# Patient Record
Sex: FEMALE | Employment: FULL TIME | URBAN - METROPOLITAN AREA
[De-identification: names, ages, dates, MRNs, and addresses within clinical notes are randomized per-mention and may not be internally consistent; named-entity substitution may affect disease eponyms.]

---

## 2017-09-27 ENCOUNTER — HOSPITAL ENCOUNTER (OUTPATIENT)
Dept: MAMMOGRAPHY | Age: 72
Discharge: HOME | End: 2017-09-27
Attending: FAMILY MEDICINE
Payer: MEDICARE

## 2017-09-27 DIAGNOSIS — Z12.31 VISIT FOR SCREENING MAMMOGRAM: ICD-10-CM

## 2017-09-27 PROCEDURE — 77067 SCR MAMMO BI INCL CAD: CPT

## 2019-12-12 ENCOUNTER — HOSPITAL ENCOUNTER (EMERGENCY)
Age: 74
Discharge: HOME | End: 2019-12-12
Attending: EMERGENCY MEDICINE
Payer: MEDICARE

## 2019-12-12 ENCOUNTER — APPOINTMENT (OUTPATIENT)
Dept: GENERAL RADIOLOGY | Age: 74
End: 2019-12-12
Attending: EMERGENCY MEDICINE
Payer: MEDICARE

## 2019-12-12 VITALS
HEART RATE: 65 BPM | OXYGEN SATURATION: 98 % | SYSTOLIC BLOOD PRESSURE: 106 MMHG | TEMPERATURE: 97.5 F | DIASTOLIC BLOOD PRESSURE: 67 MMHG | RESPIRATION RATE: 16 BRPM

## 2019-12-12 DIAGNOSIS — S60.211A CONTUSION OF RIGHT WRIST, INITIAL ENCOUNTER: ICD-10-CM

## 2019-12-12 DIAGNOSIS — S46.911A RIGHT SHOULDER STRAIN, INITIAL ENCOUNTER: ICD-10-CM

## 2019-12-12 DIAGNOSIS — S90.512A ABRASION OF LEFT ANKLE, INITIAL ENCOUNTER: Primary | ICD-10-CM

## 2019-12-12 DIAGNOSIS — H81.391: ICD-10-CM

## 2019-12-12 DIAGNOSIS — M76.812 ANTERIOR TIBIALIS TENDINITIS, LEFT: ICD-10-CM
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
## 2019-12-12 PROCEDURE — 74011250637 HC RX REV CODE- 250/637: Performed by: EMERGENCY MEDICINE

## 2019-12-12 PROCEDURE — 74011250636 HC RX REV CODE- 250/636: Performed by: EMERGENCY MEDICINE

## 2019-12-12 PROCEDURE — 73610 X-RAY EXAM OF ANKLE: CPT

## 2019-12-12 PROCEDURE — 99284 EMERGENCY DEPT VISIT MOD MDM: CPT

## 2019-12-12 RX ORDER — ACETAMINOPHEN 500 MG
1000 TABLET ORAL
Status: COMPLETED | OUTPATIENT
Start: 2019-12-12 | End: 2019-12-12

## 2019-12-12 RX ORDER — MECLIZINE HCL 12.5 MG 12.5 MG/1
50 TABLET ORAL
Status: COMPLETED | OUTPATIENT
Start: 2019-12-12 | End: 2019-12-12

## 2019-12-12 RX ORDER — ACETAMINOPHEN 500 MG
1000 TABLET ORAL
Qty: 20 TAB | Refills: 0 | Status: SHIPPED | OUTPATIENT
Start: 2019-12-12

## 2019-12-12 RX ORDER — IBUPROFEN 600 MG/1
600 TABLET ORAL
Qty: 20 TAB | Refills: 0 | Status: SHIPPED | OUTPATIENT
Start: 2019-12-12 | End: 2019-12-19

## 2019-12-12 RX ORDER — MECLIZINE HYDROCHLORIDE 25 MG/1
25 TABLET ORAL
Qty: 15 TAB | Refills: 0 | Status: SHIPPED | OUTPATIENT
Start: 2019-12-12 | End: 2019-12-22

## 2019-12-12 RX ADMIN — MECLIZINE 50 MG: 12.5 TABLET ORAL at 20:37

## 2019-12-12 RX ADMIN — ACETAMINOPHEN 1000 MG: 500 TABLET ORAL at 20:46

## 2019-12-13 NOTE — ED NOTES
I have reviewed discharge instructions with the patient. The patient verbalized understanding. Ambulatory to exit with steady gait, NAD.

## 2019-12-13 NOTE — ED PROVIDER NOTES
The history is provided by the patient. Fall   The accident occurred less than 1 hour ago. The fall occurred while walking (tripped over uneven sidewalk). She fell from a height of 1 - 2 ft. She landed on concrete. The volume of blood lost was minimal. Point of impact: left ankle, right hand. The pain is mild. She was ambulatory at the scene. There was no entrapment after the fall. There was no drug use involved in the accident. There was no alcohol use involved in the accident. Pertinent negatives include no vomiting and no laceration. Associated symptoms comments: A few episodes of vertigo after turning her head.         Past Medical History:   Diagnosis Date    Nausea & vomiting     Thyroid disease        Past Surgical History:   Procedure Laterality Date    HX BREAST BIOPSY Bilateral     80's and 90's; neg    HX CYST INCISION AND DRAINAGE Bilateral     over the years; neg    HX OTHER SURGICAL      colonoscopy - hx one colon polyp         Family History:   Problem Relation Age of Onset    Breast Cancer Mother 43    Cancer Father         brain    Breast Cancer Maternal Aunt        Social History     Socioeconomic History    Marital status:      Spouse name: Not on file    Number of children: Not on file    Years of education: Not on file    Highest education level: Not on file   Occupational History    Not on file   Social Needs    Financial resource strain: Not on file    Food insecurity:     Worry: Not on file     Inability: Not on file    Transportation needs:     Medical: Not on file     Non-medical: Not on file   Tobacco Use    Smoking status: Never Smoker    Smokeless tobacco: Never Used   Substance and Sexual Activity    Alcohol use: Yes     Comment: occasional glass of wine    Drug use: Not Currently    Sexual activity: Not on file   Lifestyle    Physical activity:     Days per week: Not on file     Minutes per session: Not on file    Stress: Not on file   Relationships    Social connections:     Talks on phone: Not on file     Gets together: Not on file     Attends Gnosticist service: Not on file     Active member of club or organization: Not on file     Attends meetings of clubs or organizations: Not on file     Relationship status: Not on file    Intimate partner violence:     Fear of current or ex partner: Not on file     Emotionally abused: Not on file     Physically abused: Not on file     Forced sexual activity: Not on file   Other Topics Concern    Not on file   Social History Narrative    Not on file         ALLERGIES: Patient has no known allergies. Review of Systems   Gastrointestinal: Negative for vomiting. All other systems reviewed and are negative. Vitals:    12/12/19 2025 12/12/19 2121   BP: 103/76 106/67   Pulse: 72 65   Resp: 16    Temp: 97.6 °F (36.4 °C) 97.5 °F (36.4 °C)   SpO2: 99% 98%            Physical Exam  Vitals signs and nursing note reviewed. Constitutional:       General: She is not in acute distress. Appearance: She is well-developed. HENT:      Head: Normocephalic and atraumatic. No contusion. Eyes:      Extraocular Movements: Extraocular movements intact. Right eye: No nystagmus. Left eye: No nystagmus. Conjunctiva/sclera: Conjunctivae normal.   Neck:      Musculoskeletal: Neck supple. Cardiovascular:      Rate and Rhythm: Normal rate and regular rhythm. Pulmonary:      Effort: Pulmonary effort is normal. No respiratory distress. Abdominal:      General: There is no distension. Musculoskeletal: Normal range of motion. General: No deformity. Comments: Tenderness over anterior ankle at tibialis tendon. Minimal right anterior shoulder tenderness. No wrist tenderness but endorsed pain with ulnar and radial deviation. No deformities. Abrasion of ankle. Skin:     General: Skin is warm and dry. Findings: No laceration. Neurological:      Mental Status: She is alert.       GCS: GCS eye subscore is 4. GCS verbal subscore is 5. GCS motor subscore is 6. Cranial Nerves: No cranial nerve deficit. Motor: Motor function is intact. Gait: Gait is intact. Comments: + right head impulse test   Psychiatric:         Behavior: Behavior normal.          MDM     68-year-old female presents after a trip and fall onto her outstretched right hand and scraping her left ankle. No signs of bony injury with evidence of soft tissue injuries to the left ankle. I suspect she has a mild strain of her right shoulder with contusion of wrist but no indication for imaging of her upper extremities. Patient given instructions for supportive care including NSAIDs, rest, ice, compression, and elevation to help alleviate symptoms. Plan to follow up with PCP as needed and return precautions discussed for worsening or new concerning symptoms.      Procedures

## 2019-12-13 NOTE — ED TRIAGE NOTES
Pt arrives s/p GLF. Dizziness s/p fall. Pt denies hitting head or LOC. Pt complaining of left ankle, right knee and right wrist pain. Advil approximately 30 mins.

## 2020-12-28 ENCOUNTER — TRANSCRIBE ORDER (OUTPATIENT)
Dept: SCHEDULING | Age: 75
End: 2020-12-28

## 2020-12-28 DIAGNOSIS — S83.249A TEAR OF MEDIAL MENISCUS OF KNEE, UNSPECIFIED LATERALITY, UNSPECIFIED TEAR TYPE, UNSPECIFIED WHETHER OLD OR CURRENT TEAR, INITIAL ENCOUNTER: Primary | ICD-10-CM

## 2020-12-28 DIAGNOSIS — M25.871 ANKLE IMPINGEMENT SYNDROME, RIGHT: ICD-10-CM

## 2021-01-05 ENCOUNTER — HOSPITAL ENCOUNTER (OUTPATIENT)
Dept: MRI IMAGING | Age: 76
Discharge: HOME | End: 2021-01-05
Attending: ORTHOPAEDIC SURGERY
Payer: MEDICARE

## 2021-01-05 DIAGNOSIS — S83.249A TEAR OF MEDIAL MENISCUS OF KNEE, UNSPECIFIED LATERALITY, UNSPECIFIED TEAR TYPE, UNSPECIFIED WHETHER OLD OR CURRENT TEAR, INITIAL ENCOUNTER: ICD-10-CM

## 2021-01-05 DIAGNOSIS — M25.871 ANKLE IMPINGEMENT SYNDROME, RIGHT: ICD-10-CM

## 2021-01-05 PROCEDURE — 73721 MRI JNT OF LWR EXTRE W/O DYE: CPT

## 2021-08-18 ENCOUNTER — TRANSCRIBE ORDER (OUTPATIENT)
Dept: SCHEDULING | Age: 76
End: 2021-08-18

## 2021-08-18 DIAGNOSIS — Z13.820 OSTEOPOROSIS SCREENING: Primary | ICD-10-CM

## 2021-08-18 DIAGNOSIS — N95.8 OTHER SPECIFIED MENOPAUSAL AND PERIMENOPAUSAL DISORDERS: ICD-10-CM

## 2021-08-18 DIAGNOSIS — Z12.31 ENCOUNTER FOR SCREENING MAMMOGRAM FOR MALIGNANT NEOPLASM OF BREAST: ICD-10-CM

## 2023-05-12 RX ORDER — ACETAMINOPHEN 500 MG
TABLET ORAL EVERY 6 HOURS PRN
COMMUNITY
Start: 2019-12-12

## 2023-05-12 RX ORDER — LEVOTHYROXINE SODIUM 137 UG/1
TABLET ORAL
COMMUNITY

## 2023-05-12 RX ORDER — BUPROPION HYDROCHLORIDE 300 MG/1
300 TABLET ORAL EVERY MORNING
COMMUNITY

## 2024-01-24 ENCOUNTER — HOSPITAL ENCOUNTER (OUTPATIENT)
Age: 79
Discharge: HOME | End: 2024-01-27
Payer: MEDICARE

## 2024-01-24 VITALS — BODY MASS INDEX: 23.66 KG/M2 | WEIGHT: 142 LBS | HEIGHT: 65 IN

## 2024-01-24 DIAGNOSIS — Z12.31 VISIT FOR SCREENING MAMMOGRAM: ICD-10-CM

## 2024-01-24 PROCEDURE — 77063 BREAST TOMOSYNTHESIS BI: CPT

## 2025-03-30 ENCOUNTER — APPOINTMENT (OUTPATIENT)
Facility: HOSPITAL | Age: 80
End: 2025-03-30
Payer: MEDICARE

## 2025-03-30 ENCOUNTER — HOSPITAL ENCOUNTER (OUTPATIENT)
Facility: HOSPITAL | Age: 80
Setting detail: OBSERVATION
Discharge: HOME | End: 2025-03-31
Attending: EMERGENCY MEDICINE | Admitting: FAMILY MEDICINE
Payer: MEDICARE

## 2025-03-30 DIAGNOSIS — R20.0 RIGHT FACIAL NUMBNESS: Primary | ICD-10-CM

## 2025-03-30 DIAGNOSIS — R42 DIZZINESS: ICD-10-CM

## 2025-03-30 DIAGNOSIS — H54.61 VISUAL LOSS, RIGHT EYE: ICD-10-CM

## 2025-03-30 LAB
ALBUMIN SERPL-MCNC: 3.3 G/DL (ref 3.5–5)
ALBUMIN/GLOB SERPL: 0.8 (ref 1.1–2.2)
ALP SERPL-CCNC: 81 U/L (ref 45–117)
ALT SERPL-CCNC: 13 U/L (ref 12–78)
ANION GAP SERPL CALC-SCNC: 6 MMOL/L (ref 2–12)
AST SERPL-CCNC: 30 U/L (ref 15–37)
BASOPHILS # BLD: 0.06 K/UL (ref 0–0.1)
BASOPHILS NFR BLD: 0.6 % (ref 0–1)
BILIRUB SERPL-MCNC: 0.9 MG/DL (ref 0.2–1)
BUN SERPL-MCNC: 13 MG/DL (ref 6–20)
BUN/CREAT SERPL: 16 (ref 12–20)
CALCIUM SERPL-MCNC: 9.2 MG/DL (ref 8.5–10.1)
CHLORIDE SERPL-SCNC: 104 MMOL/L (ref 97–108)
CO2 SERPL-SCNC: 22 MMOL/L (ref 21–32)
COMMENT:: NORMAL
CREAT SERPL-MCNC: 0.83 MG/DL (ref 0.55–1.02)
DIFFERENTIAL METHOD BLD: ABNORMAL
EOSINOPHIL # BLD: 0.07 K/UL (ref 0–0.4)
EOSINOPHIL NFR BLD: 0.7 % (ref 0–7)
ERYTHROCYTE [DISTWIDTH] IN BLOOD BY AUTOMATED COUNT: 11.9 % (ref 11.5–14.5)
GLOBULIN SER CALC-MCNC: 4.3 G/DL (ref 2–4)
GLUCOSE BLD STRIP.AUTO-MCNC: 97 MG/DL (ref 65–117)
GLUCOSE SERPL-MCNC: 85 MG/DL (ref 65–100)
HCT VFR BLD AUTO: 42.5 % (ref 35–47)
HGB BLD-MCNC: 14.5 G/DL (ref 11.5–16)
IMM GRANULOCYTES # BLD AUTO: 0.06 K/UL (ref 0–0.04)
IMM GRANULOCYTES NFR BLD AUTO: 0.6 % (ref 0–0.5)
INR PPP: 1 (ref 0.9–1.1)
LYMPHOCYTES # BLD: 1.91 K/UL (ref 0.8–3.5)
LYMPHOCYTES NFR BLD: 17.8 % (ref 12–49)
MAGNESIUM SERPL-MCNC: 2.5 MG/DL (ref 1.6–2.4)
MCH RBC QN AUTO: 31.5 PG (ref 26–34)
MCHC RBC AUTO-ENTMCNC: 34.1 G/DL (ref 30–36.5)
MCV RBC AUTO: 92.4 FL (ref 80–99)
MONOCYTES # BLD: 0.85 K/UL (ref 0–1)
MONOCYTES NFR BLD: 7.9 % (ref 5–13)
NEUTS SEG # BLD: 7.76 K/UL (ref 1.8–8)
NEUTS SEG NFR BLD: 72.4 % (ref 32–75)
NRBC # BLD: 0 K/UL (ref 0–0.01)
NRBC BLD-RTO: 0 PER 100 WBC
PLATELET # BLD AUTO: 234 K/UL (ref 150–400)
PMV BLD AUTO: 11.5 FL (ref 8.9–12.9)
POTASSIUM SERPL-SCNC: 4.9 MMOL/L (ref 3.5–5.1)
PROT SERPL-MCNC: 7.6 G/DL (ref 6.4–8.2)
PROTHROMBIN TIME: 10.8 SEC (ref 9.2–11.2)
RBC # BLD AUTO: 4.6 M/UL (ref 3.8–5.2)
SERVICE CMNT-IMP: NORMAL
SODIUM SERPL-SCNC: 132 MMOL/L (ref 136–145)
SPECIMEN HOLD: NORMAL
WBC # BLD AUTO: 10.7 K/UL (ref 3.6–11)

## 2025-03-30 PROCEDURE — 83735 ASSAY OF MAGNESIUM: CPT

## 2025-03-30 PROCEDURE — 70496 CT ANGIOGRAPHY HEAD: CPT

## 2025-03-30 PROCEDURE — 80053 COMPREHEN METABOLIC PANEL: CPT

## 2025-03-30 PROCEDURE — 99285 EMERGENCY DEPT VISIT HI MDM: CPT

## 2025-03-30 PROCEDURE — 82962 GLUCOSE BLOOD TEST: CPT

## 2025-03-30 PROCEDURE — 70450 CT HEAD/BRAIN W/O DYE: CPT

## 2025-03-30 PROCEDURE — 85610 PROTHROMBIN TIME: CPT

## 2025-03-30 PROCEDURE — 6360000004 HC RX CONTRAST MEDICATION: Performed by: RADIOLOGY

## 2025-03-30 PROCEDURE — 93005 ELECTROCARDIOGRAM TRACING: CPT | Performed by: EMERGENCY MEDICINE

## 2025-03-30 PROCEDURE — 85025 COMPLETE CBC W/AUTO DIFF WBC: CPT

## 2025-03-30 PROCEDURE — 83880 ASSAY OF NATRIURETIC PEPTIDE: CPT

## 2025-03-30 RX ORDER — IOPAMIDOL 755 MG/ML
100 INJECTION, SOLUTION INTRAVASCULAR
Status: COMPLETED | OUTPATIENT
Start: 2025-03-30 | End: 2025-03-30

## 2025-03-30 RX ADMIN — IOPAMIDOL 80 ML: 755 INJECTION, SOLUTION INTRAVENOUS at 22:24

## 2025-03-30 ASSESSMENT — PAIN DESCRIPTION - LOCATION: LOCATION: NECK

## 2025-03-30 ASSESSMENT — PAIN - FUNCTIONAL ASSESSMENT
PAIN_FUNCTIONAL_ASSESSMENT: PREVENTS OR INTERFERES SOME ACTIVE ACTIVITIES AND ADLS
PAIN_FUNCTIONAL_ASSESSMENT: 0-10

## 2025-03-30 ASSESSMENT — PAIN DESCRIPTION - ONSET: ONSET: ON-GOING

## 2025-03-30 ASSESSMENT — PAIN DESCRIPTION - DIRECTION: RADIATING_TOWARDS: TO HEAD

## 2025-03-30 ASSESSMENT — PAIN SCALES - GENERAL: PAINLEVEL_OUTOF10: 7

## 2025-03-30 ASSESSMENT — PAIN DESCRIPTION - DESCRIPTORS: DESCRIPTORS: ACHING

## 2025-03-30 ASSESSMENT — PAIN DESCRIPTION - FREQUENCY: FREQUENCY: CONTINUOUS

## 2025-03-30 ASSESSMENT — PAIN DESCRIPTION - ORIENTATION: ORIENTATION: LEFT

## 2025-03-31 ENCOUNTER — APPOINTMENT (OUTPATIENT)
Facility: HOSPITAL | Age: 80
End: 2025-03-31
Payer: MEDICARE

## 2025-03-31 VITALS
HEART RATE: 69 BPM | RESPIRATION RATE: 19 BRPM | SYSTOLIC BLOOD PRESSURE: 118 MMHG | DIASTOLIC BLOOD PRESSURE: 60 MMHG | OXYGEN SATURATION: 95 % | BODY MASS INDEX: 26.38 KG/M2 | TEMPERATURE: 98.6 F | HEIGHT: 64 IN | WEIGHT: 154.54 LBS

## 2025-03-31 PROBLEM — R29.90 STROKE-LIKE SYMPTOMS: Status: ACTIVE | Noted: 2025-03-31

## 2025-03-31 LAB
ALBUMIN SERPL-MCNC: 2.8 G/DL (ref 3.5–5)
ALBUMIN/GLOB SERPL: 0.8 (ref 1.1–2.2)
ALP SERPL-CCNC: 70 U/L (ref 45–117)
ALT SERPL-CCNC: 10 U/L (ref 12–78)
ANION GAP SERPL CALC-SCNC: 7 MMOL/L (ref 2–12)
AST SERPL-CCNC: 11 U/L (ref 15–37)
B PERT DNA SPEC QL NAA+PROBE: NOT DETECTED
BASOPHILS # BLD: 0.07 K/UL (ref 0–0.1)
BASOPHILS NFR BLD: 0.9 % (ref 0–1)
BILIRUB SERPL-MCNC: 0.6 MG/DL (ref 0.2–1)
BORDETELLA PARAPERTUSSIS BY PCR: NOT DETECTED
BUN SERPL-MCNC: 12 MG/DL (ref 6–20)
BUN/CREAT SERPL: 18 (ref 12–20)
C PNEUM DNA SPEC QL NAA+PROBE: NOT DETECTED
CALCIUM SERPL-MCNC: 8.6 MG/DL (ref 8.5–10.1)
CHLORIDE SERPL-SCNC: 105 MMOL/L (ref 97–108)
CHOLEST SERPL-MCNC: 138 MG/DL
CO2 SERPL-SCNC: 22 MMOL/L (ref 21–32)
CREAT SERPL-MCNC: 0.68 MG/DL (ref 0.55–1.02)
DIFFERENTIAL METHOD BLD: ABNORMAL
EKG ATRIAL RATE: 75 BPM
EKG DIAGNOSIS: NORMAL
EKG P AXIS: 82 DEGREES
EKG P-R INTERVAL: 144 MS
EKG Q-T INTERVAL: 384 MS
EKG QRS DURATION: 72 MS
EKG QTC CALCULATION (BAZETT): 428 MS
EKG R AXIS: 79 DEGREES
EKG T AXIS: 59 DEGREES
EKG VENTRICULAR RATE: 75 BPM
EOSINOPHIL # BLD: 0.14 K/UL (ref 0–0.4)
EOSINOPHIL NFR BLD: 1.7 % (ref 0–7)
ERYTHROCYTE [DISTWIDTH] IN BLOOD BY AUTOMATED COUNT: 12 % (ref 11.5–14.5)
EST. AVERAGE GLUCOSE BLD GHB EST-MCNC: 100 MG/DL
FLUAV SUBTYP SPEC NAA+PROBE: NOT DETECTED
FLUBV RNA SPEC QL NAA+PROBE: NOT DETECTED
GLOBULIN SER CALC-MCNC: 3.7 G/DL (ref 2–4)
GLUCOSE SERPL-MCNC: 78 MG/DL (ref 65–100)
HADV DNA SPEC QL NAA+PROBE: NOT DETECTED
HBA1C MFR BLD: 5.1 % (ref 4–5.6)
HCOV 229E RNA SPEC QL NAA+PROBE: NOT DETECTED
HCOV HKU1 RNA SPEC QL NAA+PROBE: NOT DETECTED
HCOV NL63 RNA SPEC QL NAA+PROBE: NOT DETECTED
HCOV OC43 RNA SPEC QL NAA+PROBE: NOT DETECTED
HCT VFR BLD AUTO: 42.3 % (ref 35–47)
HDLC SERPL-MCNC: 52 MG/DL
HDLC SERPL: 2.7 (ref 0–5)
HGB BLD-MCNC: 14.4 G/DL (ref 11.5–16)
HMPV RNA SPEC QL NAA+PROBE: NOT DETECTED
HPIV1 RNA SPEC QL NAA+PROBE: NOT DETECTED
HPIV2 RNA SPEC QL NAA+PROBE: NOT DETECTED
HPIV3 RNA SPEC QL NAA+PROBE: NOT DETECTED
HPIV4 RNA SPEC QL NAA+PROBE: NOT DETECTED
IMM GRANULOCYTES # BLD AUTO: 0.05 K/UL (ref 0–0.04)
IMM GRANULOCYTES NFR BLD AUTO: 0.6 % (ref 0–0.5)
LDLC SERPL CALC-MCNC: 71.4 MG/DL (ref 0–100)
LYMPHOCYTES # BLD: 1.71 K/UL (ref 0.8–3.5)
LYMPHOCYTES NFR BLD: 21.3 % (ref 12–49)
M PNEUMO DNA SPEC QL NAA+PROBE: NOT DETECTED
MAGNESIUM SERPL-MCNC: 2.3 MG/DL (ref 1.6–2.4)
MCH RBC QN AUTO: 31.2 PG (ref 26–34)
MCHC RBC AUTO-ENTMCNC: 34 G/DL (ref 30–36.5)
MCV RBC AUTO: 91.6 FL (ref 80–99)
MONOCYTES # BLD: 0.66 K/UL (ref 0–1)
MONOCYTES NFR BLD: 8.2 % (ref 5–13)
NEUTS SEG # BLD: 5.39 K/UL (ref 1.8–8)
NEUTS SEG NFR BLD: 67.3 % (ref 32–75)
NRBC # BLD: 0 K/UL (ref 0–0.01)
NRBC BLD-RTO: 0 PER 100 WBC
NT PRO BNP: 53 PG/ML
PLATELET # BLD AUTO: 221 K/UL (ref 150–400)
PMV BLD AUTO: 11.7 FL (ref 8.9–12.9)
POTASSIUM SERPL-SCNC: 3.2 MMOL/L (ref 3.5–5.1)
PROT SERPL-MCNC: 6.5 G/DL (ref 6.4–8.2)
RBC # BLD AUTO: 4.62 M/UL (ref 3.8–5.2)
RSV RNA SPEC QL NAA+PROBE: NOT DETECTED
RV+EV RNA SPEC QL NAA+PROBE: NOT DETECTED
SARS-COV-2 RNA RESP QL NAA+PROBE: NOT DETECTED
SODIUM SERPL-SCNC: 134 MMOL/L (ref 136–145)
TRIGL SERPL-MCNC: 73 MG/DL
VLDLC SERPL CALC-MCNC: 14.6 MG/DL
WBC # BLD AUTO: 8 K/UL (ref 3.6–11)

## 2025-03-31 PROCEDURE — 83036 HEMOGLOBIN GLYCOSYLATED A1C: CPT

## 2025-03-31 PROCEDURE — 6370000000 HC RX 637 (ALT 250 FOR IP): Performed by: FAMILY MEDICINE

## 2025-03-31 PROCEDURE — 99222 1ST HOSP IP/OBS MODERATE 55: CPT | Performed by: NURSE PRACTITIONER

## 2025-03-31 PROCEDURE — 97535 SELF CARE MNGMENT TRAINING: CPT

## 2025-03-31 PROCEDURE — 83735 ASSAY OF MAGNESIUM: CPT

## 2025-03-31 PROCEDURE — G0378 HOSPITAL OBSERVATION PER HR: HCPCS

## 2025-03-31 PROCEDURE — 97530 THERAPEUTIC ACTIVITIES: CPT

## 2025-03-31 PROCEDURE — 80061 LIPID PANEL: CPT

## 2025-03-31 PROCEDURE — 97165 OT EVAL LOW COMPLEX 30 MIN: CPT

## 2025-03-31 PROCEDURE — 2500000003 HC RX 250 WO HCPCS: Performed by: NURSE PRACTITIONER

## 2025-03-31 PROCEDURE — 93010 ELECTROCARDIOGRAM REPORT: CPT | Performed by: SPECIALIST

## 2025-03-31 PROCEDURE — 0202U NFCT DS 22 TRGT SARS-COV-2: CPT

## 2025-03-31 PROCEDURE — 6370000000 HC RX 637 (ALT 250 FOR IP): Performed by: NURSE PRACTITIONER

## 2025-03-31 PROCEDURE — 70551 MRI BRAIN STEM W/O DYE: CPT

## 2025-03-31 PROCEDURE — 6370000000 HC RX 637 (ALT 250 FOR IP): Performed by: EMERGENCY MEDICINE

## 2025-03-31 PROCEDURE — 80053 COMPREHEN METABOLIC PANEL: CPT

## 2025-03-31 PROCEDURE — 85025 COMPLETE CBC W/AUTO DIFF WBC: CPT

## 2025-03-31 PROCEDURE — 97161 PT EVAL LOW COMPLEX 20 MIN: CPT

## 2025-03-31 RX ORDER — BUPROPION HYDROCHLORIDE 150 MG/1
300 TABLET ORAL EVERY MORNING
Status: DISCONTINUED | OUTPATIENT
Start: 2025-03-31 | End: 2025-03-31

## 2025-03-31 RX ORDER — ONDANSETRON 2 MG/ML
4 INJECTION INTRAMUSCULAR; INTRAVENOUS EVERY 6 HOURS PRN
Status: DISCONTINUED | OUTPATIENT
Start: 2025-03-31 | End: 2025-03-31 | Stop reason: HOSPADM

## 2025-03-31 RX ORDER — VITAMIN B COMPLEX
1 TABLET ORAL DAILY
Status: DISCONTINUED | OUTPATIENT
Start: 2025-03-31 | End: 2025-03-31 | Stop reason: HOSPADM

## 2025-03-31 RX ORDER — SODIUM CHLORIDE 0.9 % (FLUSH) 0.9 %
5-40 SYRINGE (ML) INJECTION PRN
Status: DISCONTINUED | OUTPATIENT
Start: 2025-03-31 | End: 2025-03-31 | Stop reason: HOSPADM

## 2025-03-31 RX ORDER — SODIUM CHLORIDE 9 MG/ML
INJECTION, SOLUTION INTRAVENOUS PRN
Status: DISCONTINUED | OUTPATIENT
Start: 2025-03-31 | End: 2025-03-31 | Stop reason: HOSPADM

## 2025-03-31 RX ORDER — GUAIFENESIN 200 MG/10ML
200 LIQUID ORAL EVERY 4 HOURS PRN
Qty: 118 ML | Refills: 0 | Status: SHIPPED | OUTPATIENT
Start: 2025-03-31 | End: 2025-04-07

## 2025-03-31 RX ORDER — ONDANSETRON 4 MG/1
4 TABLET, ORALLY DISINTEGRATING ORAL EVERY 8 HOURS PRN
Status: DISCONTINUED | OUTPATIENT
Start: 2025-03-31 | End: 2025-03-31 | Stop reason: HOSPADM

## 2025-03-31 RX ORDER — AZITHROMYCIN 250 MG/1
TABLET, FILM COATED ORAL
Qty: 6 TABLET | Refills: 0 | Status: SHIPPED | OUTPATIENT
Start: 2025-03-31 | End: 2025-04-10

## 2025-03-31 RX ORDER — BUPROPION HYDROCHLORIDE 150 MG/1
450 TABLET ORAL EVERY MORNING
Status: DISCONTINUED | OUTPATIENT
Start: 2025-03-31 | End: 2025-03-31 | Stop reason: HOSPADM

## 2025-03-31 RX ORDER — POLYETHYLENE GLYCOL 3350 17 G/17G
17 POWDER, FOR SOLUTION ORAL DAILY PRN
Status: DISCONTINUED | OUTPATIENT
Start: 2025-03-31 | End: 2025-03-31 | Stop reason: HOSPADM

## 2025-03-31 RX ORDER — DOXYCYCLINE 100 MG/1
100 CAPSULE ORAL EVERY 12 HOURS SCHEDULED
Status: DISCONTINUED | OUTPATIENT
Start: 2025-03-31 | End: 2025-03-31 | Stop reason: HOSPADM

## 2025-03-31 RX ORDER — BENZONATATE 100 MG/1
100 CAPSULE ORAL 3 TIMES DAILY PRN
Status: DISCONTINUED | OUTPATIENT
Start: 2025-03-31 | End: 2025-03-31 | Stop reason: HOSPADM

## 2025-03-31 RX ORDER — CETIRIZINE HYDROCHLORIDE 5 MG/1
5 TABLET ORAL DAILY
Status: DISCONTINUED | OUTPATIENT
Start: 2025-03-31 | End: 2025-03-31 | Stop reason: HOSPADM

## 2025-03-31 RX ORDER — ASPIRIN 81 MG/1
81 TABLET, CHEWABLE ORAL ONCE
Status: COMPLETED | OUTPATIENT
Start: 2025-03-31 | End: 2025-03-31

## 2025-03-31 RX ORDER — SODIUM CHLORIDE 0.9 % (FLUSH) 0.9 %
5-40 SYRINGE (ML) INJECTION EVERY 12 HOURS SCHEDULED
Status: DISCONTINUED | OUTPATIENT
Start: 2025-03-31 | End: 2025-03-31 | Stop reason: HOSPADM

## 2025-03-31 RX ORDER — FLUTICASONE PROPIONATE 50 MCG
1 SPRAY, SUSPENSION (ML) NASAL DAILY PRN
Qty: 16 G | Refills: 3 | Status: SHIPPED | OUTPATIENT
Start: 2025-03-31

## 2025-03-31 RX ORDER — POTASSIUM CHLORIDE 750 MG/1
20 TABLET, EXTENDED RELEASE ORAL ONCE
Status: COMPLETED | OUTPATIENT
Start: 2025-03-31 | End: 2025-03-31

## 2025-03-31 RX ORDER — GUAIFENESIN 200 MG/10ML
200 LIQUID ORAL EVERY 4 HOURS PRN
Status: DISCONTINUED | OUTPATIENT
Start: 2025-03-31 | End: 2025-03-31 | Stop reason: HOSPADM

## 2025-03-31 RX ORDER — FLUTICASONE PROPIONATE 50 MCG
1 SPRAY, SUSPENSION (ML) NASAL DAILY PRN
Status: DISCONTINUED | OUTPATIENT
Start: 2025-03-31 | End: 2025-03-31 | Stop reason: HOSPADM

## 2025-03-31 RX ADMIN — LEVOTHYROXINE SODIUM 137 MCG: 0.11 TABLET ORAL at 08:30

## 2025-03-31 RX ADMIN — CETIRIZINE HYDROCHLORIDE 5 MG: 5 SOLUTION ORAL at 08:31

## 2025-03-31 RX ADMIN — BENZONATATE 100 MG: 100 CAPSULE ORAL at 12:19

## 2025-03-31 RX ADMIN — POTASSIUM CHLORIDE 20 MEQ: 750 TABLET, FILM COATED, EXTENDED RELEASE ORAL at 14:45

## 2025-03-31 RX ADMIN — Medication 1000 UNITS: at 08:29

## 2025-03-31 RX ADMIN — GUAIFENESIN 200 MG: 200 SOLUTION ORAL at 13:55

## 2025-03-31 RX ADMIN — BENZONATATE 100 MG: 100 CAPSULE ORAL at 02:47

## 2025-03-31 RX ADMIN — BUPROPION HYDROCHLORIDE 450 MG: 150 TABLET, EXTENDED RELEASE ORAL at 08:29

## 2025-03-31 RX ADMIN — ASPIRIN 81 MG: 81 TABLET, CHEWABLE ORAL at 01:27

## 2025-03-31 RX ADMIN — DOXYCYCLINE 100 MG: 100 CAPSULE ORAL at 06:09

## 2025-03-31 NOTE — ED NOTES
Pt report given to Caryn SQUIRES. Neuro NP at the bedside evaluating patient. Pt to be transported upstairs when eval comeplted.

## 2025-03-31 NOTE — PROGRESS NOTES
Orders received, chart reviewed and patient evaluated by physical therapy.  Pt is indep w/ all mobility, demonstrates normal strength, ROM & coordination.  Acute therapy is not indicated, will sign off.    Recommendation: Pt may want to consider OP PT for her cervico-occipital pain and dizziness (+ L nystagmus) s/p a fall 1-2 weeks ago.      Full evaluation to follow.

## 2025-03-31 NOTE — PLAN OF CARE
PHYSICAL THERAPY EVALUATION/DISCHARGE    Patient: Belkys Jones (80 y.o. female)  Date: 3/31/2025  Primary Diagnosis: Dizziness [R42]  Right facial numbness [R20.0]  Visual loss, right eye [H54.61]  Stroke-like symptoms [R29.90]       Precautions: Restrictions/Precautions  Restrictions/Precautions: None            ASSESSMENT AND RECOMMENDATIONS:  Received pt on the ER stretcher s/p admission for stroke work up d/t one wk R visual impairment, R facial numbness and dizziness. Pt endorses a fall w/ head strike about two weeks ago leaving her w/ a \"black\" eye, now resolved.   CT/CTA w/ this admission neg, MRI pending.  Pt demonstrates equal and normal strength, ROM and coordination. She is indep mobilizing w/o DME, continues w/ some dizziness and R blurred vision which do not limit her functional mobility.  She is tender to palpation at the L occipital area and + for nystagmus w/ visual tracking.      Given pt is indep w/ no functional impairments, additional acute therapy is not indicated, will sign off.  Recommend OP PT follow up for her pain and dizziness for post concussion vs vestibular assessments.      Functional Outcome Measure:  The patient scored 24/24 on the [facility] outcome measure.   Cutoff score <=171,2,3 had higher odds of discharging home with home health or need of SNF/IPR.     PLAN :  Recommendation for discharge: (in order for the patient to meet his/her long term goals):   Outpatient physical therapy for cervico-occipital pain and dizziness (concussion vs vertigo assessments).    Other factors to consider for discharge: no additional factors    IF patient discharges home will need the following DME: none       SUBJECTIVE:   Patient stated “I'll show you.\" Re: her bruised eye    OBJECTIVE DATA SUMMARY:     Past Medical History:   Diagnosis Date    Nausea & vomiting     Thyroid disease      Past Surgical History:   Procedure Laterality Date    BREAST BIOPSY Bilateral     80's and 90's; neg    CYST

## 2025-03-31 NOTE — ED PROVIDER NOTES
[facility] Consult for Admission  1:06 AM    ED Room Number: ER08/08  Patient Name and age:  Belkys Jones 80 y.o.  female  Working Diagnosis:   1. Right facial numbness    2. Dizziness    3. Visual loss, right eye        COVID-19 Suspicion: No  Sepsis present:  No    Code Status:  Full Code  Readmission: No  Isolation Requirements: no  Recommended Level of Care: telemetry  Department: [facility] Adult ED - [phone]    Other: Patient presenting due to a week of decreased vision in the right eye, right-sided facial numbness as well as dizziness.  Followed up with her eye doctor who could not find any ocular abnormalities.  CTA and CT head are negative.  Concern for potential stroke.  Giving aspirin        Roman Lynch MD  03/31/25 1516    
Plain radiographic images are visualized and preliminarily interpreted by the emergency physician with the below findings:    Interpretation per the Radiologist below, if available at the time of this note:    CT HEAD WO CONTRAST    (Results Pending)   CTA HEAD NECK W CONTRAST    (Results Pending)        LABS:  Labs Reviewed   CBC WITH AUTO DIFFERENTIAL   COMPREHENSIVE METABOLIC PANEL   PROTIME-INR   MAGNESIUM   POCT GLUCOSE       All other labs were within normal range or not returned as of this dictation.    EMERGENCY DEPARTMENT COURSE and DIFFERENTIAL DIAGNOSIS/MDM:   Vitals:    Vitals:    03/30/25 2034   BP: (!) 123/43   Pulse: 74   Resp: 17   Temp: 98 °F (36.7 °C)   TempSrc: Oral   SpO2: 99%   Weight: 70.1 kg (154 lb 8.7 oz)   Height: 1.626 m (5' 4\")           Medical Decision Making  Amount and/or Complexity of Data Reviewed  Labs: ordered.  Radiology: ordered.  ECG/medicine tests: ordered.            REASSESSMENT      9:02 PM  Change of shift.  Care of patient signed over to Dr Lynch.  Bedside handoff complete. Awaiting labs, CT/CTA head.  Concern for stroke but sumptoms x 1 week.      CRITICAL CARE TIME       CONSULTS:  None    PROCEDURES:  Unless otherwise noted below, none     Procedures        FINAL IMPRESSION    No diagnosis found.      DISPOSITION/PLAN   DISPOSITION        PATIENT REFERRED TO:  No follow-up provider specified.    DISCHARGE MEDICATIONS:  New Prescriptions    No medications on file         (Please note that portions of this note were completed with a voice recognition program.  Efforts were made to edit the dictations but occasionally words are mis-transcribed.)    Michela Marquis MD (electronically signed)  Emergency Attending Physician            Michela Marquis MD  03/30/25 3104

## 2025-03-31 NOTE — CONSULTS
NEUROLOGY CONSULT NOTE    Name Belkys Jones Age 80 y.o.   MRN [id number]  1945     Consulting Physician: Niki Newberry MD      Chief Complaint:  URI     Assessment:     Principal Problem:    Stroke-like symptoms  Resolved Problems:    * No resolved hospital problems. *    Patient is a 80 yof with h/o hypothyroidism who presented with multiple in setting of URI. R visual changes more subacute and related to cataract recently seen by ophthalmology outpatient. Symptoms mostly nonspecific and likely related to URI. CT head negative. CTA head/neck negative for significant stenosis or occlusion. MRI brain negative for acute infarct.   LDL 71.4, A1c 5.1, Respiratory viral panel negative    Recommendations:   - Does not need to continue aspirin or statin from Neuro standpoint  - No further stroke work-up from neuro standpoint   - Patient has f/u appt with Ophthalmology next week   - Treatment of URI per primary team     No further recommendations. Neurology will now sign off.   History of Present Illness:      This is a 80 y.o. female with history of hypothyroidism who presented to the ED yesterday after having symptoms of an URI and she went to [facility]. She at that time also mentioned her recent R-eye vision changes and there was concern for stroke given this. She recently went on a trip to [place], [place]and [place]. She noticed on her flight home last weekend ~3/22/25 that she had decreased visual acuity to her R-eye. Also right her this time she began to develop URI symptoms but she never had a fever and got progressively worse. She did go to her ophthalmologist last week and states she had 20/100 vision on R and 20/30 on L. She was diagnosed with a combined cortical nuclear cataract. She states her vision has not been worse since that appointment. She states she did not think she was having a stroke. She has also had recently unsteady gait, headache and neck pain. Neurology is consulted

## 2025-03-31 NOTE — DISCHARGE INSTRUCTIONS
Discharge Instructions       PATIENT ID: Belkys Jones  MRN: [id number]   YOB: 1945    DATE OF ADMISSION: 3/30/2025   DATE OF DISCHARGE: 3/31/2025    PRIMARY CARE PROVIDER: Claudio Olvera     ATTENDING PHYSICIAN: Niki Newberry MD   DISCHARGING PROVIDER: KERI Aragon NP    To contact this individual call [phone] and ask the  to page.   If unavailable ask to be transferred the Adult Hospitalist Department.    DISCHARGE DIAGNOSEW: Stroke ruled out    CONSULTATIONS: Neurology    PROCEDURES/SURGERIES: * No surgery found *    PENDING TEST RESULTS:   At the time of discharge the following test results are still pending: none    FOLLOW UP APPOINTMENTS:   PCP    ADDITIONAL CARE RECOMMENDATIONS:   All your imaging was negative for stroke  [employer] so you can see all your results     DIET: Regular    ACTIVITY: activity as tolerated    WOUND CARE: none    EQUIPMENT needed: none      DISCHARGE MEDICATIONS:   See Medication Reconciliation Form    It is important that you take the medication exactly as they are prescribed.   Keep your medication in the bottles provided by the pharmacist and keep a list of the medication names, dosages, and times to be taken in your wallet.   Do not take other medications without consulting your doctor.       NOTIFY YOUR PHYSICIAN FOR ANY OF THE FOLLOWING:   Fever over 101 degrees for 24 hours.   Chest pain, shortness of breath, fever, chills, nausea, vomiting, diarrhea, change in mentation, falling, weakness, bleeding. Severe pain or pain not relieved by medications.  Or, any other signs or symptoms that you may have questions about.      DISPOSITION:   x Home With:   OT  PT  HH  RN       SNF/Inpatient Rehab/LTAC    Independent/assisted living    Hospice    Other:     CDMP Checked:   Yes x     PROBLEM LIST Updated:  Yes x       Signed:   KERI Aragon NP  3/31/2025  2:14 PM

## 2025-03-31 NOTE — PROGRESS NOTES
Occupational Therapy  03/31/25    Orders received, chart reviewed and patient evaluated by occupational therapy. Pending progression with skilled acute occupational therapy, recommend:    Outpatient PT for concussion therapy?    Recommend with nursing patient to complete as able in order to maintain strength, endurance and independence: OOB to chair 3x/day, ADLs with Spv and performing toileting with Spv assist, functional mobility to bathroom. Thank you for your assistance.     Full evaluation to follow.   Bouchra Mcdaniel, OT

## 2025-03-31 NOTE — H&P
History and Physical    Date of Service:  3/31/2025  Primary Care Provider: Claudio Olvera MD  Source of information: The patient and Chart review    Chief Complaint: Vision Change, Nasal Congestion, and Neck Pain      History of Presenting Illness:   Belkys Jones is a 80 y.o. female with past medical history of hypothyroidism presented to the emergency department with chief complaint of right-sided vision loss, right facial numbness, unsteady gait, headache, neck pain.  Per initial reports, symptoms onset reportedly began about 1 week ago when she started having vision changes in her right eye.  About 2 weeks ago she reportedly had a fall resulting in a black left eye.  She notedly had unsteady gait and other complaints now of headache and neck pain.  Yesterday, patient went to [facility] urgent care center where she was told that she had visual acuity of 20/100 right eye and 20/30 in left eye.  Tonight, on arrival emergency department, initial recorded vital signs were temperature 98.0 °F, /43, heart rate 74, respiratory rate 17, O2 saturation 99% on room air.  12-lead EKG shows sinus rhythm, PAC, nonspecific ST/T wave changes at 75 bpm.  Abnormal labs included sodium 132, magnesium 2.5, albumin 3.3.  CT head without IV contrast showed no acute intracranial abnormality.  CTA head neck with IV contrast showed no large vessel occlusion and no acute abnormality.  ED ordered aspirin 81 mg p.o. x 1 dose.  Patient is now seen for admission to the hospitalist service.  On arrival at the bedside, patient clarifies that her symptoms actually started 2 weeks ago when she was on her trip to [facility].  During the trip, she fell, hitting the left side of her face, with black eye on the left side.  She notes that she traveled on an airplane flight from [facility] to [facility] to [facility].  She knows on the airplane flight back, she noted blurred vision in her right eye.  Last week she

## 2025-03-31 NOTE — PROGRESS NOTES
OCCUPATIONAL THERAPY EVALUATION/DISCHARGE  Patient: Belkys Jones (80 y.o. female)  Date: 3/31/2025  Primary Diagnosis: Dizziness [R42]  Right facial numbness [R20.0]  Visual loss, right eye [H54.61]  Stroke-like symptoms [R29.90]       Precautions: None                  ASSESSMENT :  Pt presented to ED from urgent care for stroke work up. Pt reporting decreased vision in R eye for the past week. Pt reports upper respiratory infection since her vacation from [facility]. Pt with significant cough limiting her ability to communicate during OT evaluation. Of note, some pink/redness on tissue after coughing, RN notified. Based on the objective data below, the patient is completing BADLs independently. Pt demonstrating equal and WFL BUE strength, coordination, and sensation. Pt endorses decreased visual acuity in R eye with monocular vision. Visual tracking demonstrating some nystagmus at end ranges with no S/S of visual discomfort. Pt demonstrates good safety awareness, functional reach to LE for LB dressing, and standing balance. Pt left seated in bed with all needs met. Will complete OT orders at this time, please re-consult if functional status changes.    Functional Outcome Measure:  The patient scored 24/24 on the [facility] outcome measure which is indicative of lower odds of benefiting from rehab at discharge in a facility versus home.      Further skilled acute occupational therapy is not indicated at this time.     PLAN :  Recommend with staff: up with Spv    Recommendation for discharge: (in order for the patient to meet his/her long term goals):   No skilled occupational therapy    Other factors to consider for discharge: no additional factors    IF patient discharges home will need the following DME: none     SUBJECTIVE:   Patient stated, “I think the cough medicine is wearing off.”    OBJECTIVE DATA SUMMARY:     Past Medical History:   Diagnosis Date    Nausea & vomiting     Thyroid disease      Past Surgical

## 2025-03-31 NOTE — ED TRIAGE NOTES
[facility] of vision change, abnormal gait, neck pain, and HA. Pt states her right eye had a change in vision about a week ago. Triage Rn provided neuro assessment and pt states she has decrease sensation in right cheek. Pt denies dizziness, chest pain, SOB. Pt has a recent fall two weeks ago and had a black on left eye. No blood thinners.

## 2025-03-31 NOTE — CARE COORDINATION
Care Management Initial Assessment       RUR: NA observation status  Readmission? No  MOON letter given? Yes - 03/31/25  Verified Insurance: Medicare A and B, Advance BCBS supplement  Emergency Contacts: sister Dr. Joyce Allen [phone]  Transportation: family vs Hospital to Home ambulatory transport provided by family    3-31-25 Patient admitted under observation status to [facility] for rule out stroke.    Received call from patient's sister Dr. Joyce Allen. She said patient may discharge today if she is cleared by neurology. She said they were waiting on results of MRI. She said depending on time of transport, she may be able to transport patient or she may need other transport. She asked CM for recommendation for transportation. She said patient is independent with ambulation. Per PT note, no PT services needed. Gave there the number to [facility] for ambulatory transportation.     Per sister, patient does have a secondary insurance. Will give this information to registration.     Residence: confirmed address, house is 2 level with bedroom and bath upstairs-sister stays with her at time, sometimes patient stays with her   ADL: independent, drives  DME: none  Pharmacy: [facility]  PCP: [facility] in [place]-no specific MD, seen day of admission  Verified Insurance: Medicare A and B, Advance BCBS supplement  Emergency Contacts: sister Dr. Joyce Allen [phone]  Previous rehab/services: none  Transportation: self, family         03/31/25 1222   Service Assessment   Patient Orientation Alert and Oriented   Cognition Alert   History Provided By Child/Family  (sister Dr. Joyce Allen)   Primary Caregiver Self   Support Systems Spouse/Significant Other;Family Members   Patient's Healthcare Decision Maker is:   (sister Joyce Allen)   PCP Verified by CM Yes  ([facility] in [place]-no specific physician)   Last Visit to PCP Within last 3 months   Prior Functional Level

## 2025-03-31 NOTE — ED NOTES
Pt ambulated back and forth to the bathroom without incident. Pt placed back in bed and on monitor.

## 2025-03-31 NOTE — PLAN OF CARE
Problem: Discharge Planning  Goal: Discharge to home or other facility with appropriate resources  Outcome: Adequate for Discharge  Flowsheets (Taken 3/31/2025 1130)  Discharge to home or other facility with appropriate resources:   Identify barriers to discharge with patient and caregiver   Identify discharge learning needs (meds, wound care, etc)   Arrange for needed discharge resources and transportation as appropriate   Arrange for interpreters to assist at discharge as needed   Refer to discharge planning if patient needs post-hospital services based on physician order or complex needs related to functional status, cognitive ability or social support system     Problem: Pain  Goal: Verbalizes/displays adequate comfort level or baseline comfort level  Outcome: Adequate for Discharge

## 2025-03-31 NOTE — PROGRESS NOTES
Speech Therapy Contact Note    Order received and chart reviewed. MRI Brain: \"Chronic microvascular ischemic disease with no acute infarction.\" During brief interview, patient denies decline in swallow, speech, or language function from baseline. Patient is currently NPO but has passed [place] swallow screen; recommend patient initiate PO diet as cleared by medical team. Will defer SLP evaluation at this time; please re-consult as medically appropriate.     Mone Kemp, CCC-SLP

## 2025-03-31 NOTE — ED NOTES
Pt is wanting to take her own doxycycline. Barcoded bag received from pharmacy and bottle placed in bag.